# Patient Record
Sex: MALE | Race: WHITE | NOT HISPANIC OR LATINO | Employment: FULL TIME | URBAN - METROPOLITAN AREA
[De-identification: names, ages, dates, MRNs, and addresses within clinical notes are randomized per-mention and may not be internally consistent; named-entity substitution may affect disease eponyms.]

---

## 2024-05-09 ENCOUNTER — OFFICE VISIT (OUTPATIENT)
Dept: UROLOGY | Facility: CLINIC | Age: 74
End: 2024-05-09
Payer: MEDICARE

## 2024-05-09 VITALS
HEIGHT: 72 IN | SYSTOLIC BLOOD PRESSURE: 162 MMHG | HEART RATE: 57 BPM | BODY MASS INDEX: 27.77 KG/M2 | OXYGEN SATURATION: 94 % | DIASTOLIC BLOOD PRESSURE: 92 MMHG | WEIGHT: 205 LBS

## 2024-05-09 DIAGNOSIS — N52.9 ERECTILE DYSFUNCTION, UNSPECIFIED ERECTILE DYSFUNCTION TYPE: Primary | ICD-10-CM

## 2024-05-09 DIAGNOSIS — R97.20 ELEVATED PSA: ICD-10-CM

## 2024-05-09 DIAGNOSIS — Z80.42 FAMILY HISTORY OF PROSTATE CANCER IN FATHER: ICD-10-CM

## 2024-05-09 LAB — PSA, DIAGNOSTIC: 5.7 NG/ML (ref 0.1–4)

## 2024-05-09 PROCEDURE — 1101F PT FALLS ASSESS-DOCD LE1/YR: CPT | Mod: CPTII,S$GLB,, | Performed by: UROLOGY

## 2024-05-09 PROCEDURE — 1159F MED LIST DOCD IN RCRD: CPT | Mod: CPTII,S$GLB,, | Performed by: UROLOGY

## 2024-05-09 PROCEDURE — 3288F FALL RISK ASSESSMENT DOCD: CPT | Mod: CPTII,S$GLB,, | Performed by: UROLOGY

## 2024-05-09 PROCEDURE — 36415 COLL VENOUS BLD VENIPUNCTURE: CPT | Mod: S$GLB,,, | Performed by: UROLOGY

## 2024-05-09 PROCEDURE — 1126F AMNT PAIN NOTED NONE PRSNT: CPT | Mod: CPTII,S$GLB,, | Performed by: UROLOGY

## 2024-05-09 PROCEDURE — 3077F SYST BP >= 140 MM HG: CPT | Mod: CPTII,S$GLB,, | Performed by: UROLOGY

## 2024-05-09 PROCEDURE — 99204 OFFICE O/P NEW MOD 45 MIN: CPT | Mod: S$GLB,,, | Performed by: UROLOGY

## 2024-05-09 PROCEDURE — 3080F DIAST BP >= 90 MM HG: CPT | Mod: CPTII,S$GLB,, | Performed by: UROLOGY

## 2024-05-09 RX ORDER — MULTIVIT WITH MINERALS/HERBS
1 TABLET ORAL DAILY
COMMUNITY

## 2024-05-09 NOTE — PROGRESS NOTES
Subjective:       Patient ID: Master Kate is a 73 y.o. male.    Chief Complaint: No chief complaint on file.      HPI:  73-year-old male patient presenting to the clinic to establish care.  Patient has a family history of prostate cancer and 3 of his brothers.  The patient has not had a recent PSA in over 4 years.  He is unsure if his PSA has ever been elevated.  He denies any issues at today's visit.  We do not have any access to his previous records.  He does not have a PCP    Past Medical History: History reviewed. No pertinent past medical history.    Past Surgical Historical: History reviewed. No pertinent surgical history.     Medications:   Medication List with Changes/Refills   Current Medications    B COMPLEX VITAMINS TABLET    Take 1 tablet by mouth once daily.        Past Social History:   Social History     Socioeconomic History    Marital status: Single   Tobacco Use    Smoking status: Never     Passive exposure: Never    Smokeless tobacco: Never   Substance and Sexual Activity    Alcohol use: Not Currently     Comment: not often       Allergies:   Review of patient's allergies indicates:   Allergen Reactions    Pcn [penicillins] Rash        Family History:   Family History   Problem Relation Name Age of Onset    Prostate cancer Father      Prostate cancer Brother          Review of Systems:  Review of Systems   Constitutional:  Negative for activity change and appetite change.   HENT:  Negative for congestion and dental problem.    Eyes:  Negative for visual disturbance.   Respiratory:  Negative for chest tightness and shortness of breath.    Cardiovascular:  Negative for chest pain.   Gastrointestinal:  Negative for abdominal distention and abdominal pain.   Endocrine: Negative for polyuria.   Genitourinary:  Negative for difficulty urinating, dysuria, flank pain, frequency, hematuria, penile discharge, penile pain, penile swelling, scrotal swelling, testicular pain and urgency.   Musculoskeletal:   Negative for back pain and neck pain.   Skin:  Negative for color change.   Allergic/Immunologic: Positive for immunocompromised state.   Neurological:  Negative for dizziness.   Hematological:  Negative for adenopathy.   Psychiatric/Behavioral:  Negative for agitation, behavioral problems and confusion.        Physical Exam:  Physical Exam  Constitutional:       Appearance: He is well-developed.   HENT:      Head: Normocephalic and atraumatic.   Eyes:      Pupils: Pupils are equal, round, and reactive to light.   Cardiovascular:      Rate and Rhythm: Normal rate and regular rhythm.      Heart sounds: Normal heart sounds.   Pulmonary:      Effort: Pulmonary effort is normal. No respiratory distress.      Breath sounds: Normal breath sounds. No wheezing or rales.   Abdominal:      General: Bowel sounds are normal. There is no distension.      Palpations: Abdomen is soft.      Tenderness: There is no abdominal tenderness. There is no guarding or rebound.   Genitourinary:     Penis: Normal. No tenderness.       Prostate: Normal.   Musculoskeletal:         General: Normal range of motion.         Assessment/Plan:     Family history of prostate cancer:  Patient has not had a recent PSA we will draw his PSA today's visit and notify him of results.  Emphasized the importance of regular follow-up due to significant family history of prostate cancer.  No other issues at this time    Erectile dysfunction:  Managed with Cialis.  Patient does not need a refill at this time.  Notify our clinic if refill is needed in the future  Problem List Items Addressed This Visit    None

## 2024-05-09 NOTE — PROGRESS NOTES
Due to patient's BP being elevated, it was rechecked at end of appointment at 1040 prior to PS blood draw, resulting in 160/84.   PSA blood draw, using aseptic technique, butterfly stick x1 to right a/c, with success. 1X1 gauze was applied and wrapped with coban. Patient tolerated well.

## 2024-05-10 ENCOUNTER — TELEPHONE (OUTPATIENT)
Dept: UROLOGY | Facility: CLINIC | Age: 74
End: 2024-05-10
Payer: MEDICARE

## 2024-05-10 NOTE — TELEPHONE ENCOUNTER
Spoke with pt and informed of results. Pt set up for 6 months out with pa prior per Dr. Wilson. ----- Message from Rossana Reyes NP sent at 5/10/2024  9:01 AM CDT -----  Please notify patient of PSA results. Dr. Wilson reviewed results and suggests 6 month follow up with a PSA prior.

## 2024-10-24 ENCOUNTER — PATIENT MESSAGE (OUTPATIENT)
Dept: RESEARCH | Facility: HOSPITAL | Age: 74
End: 2024-10-24
Payer: MEDICARE

## 2025-05-06 ENCOUNTER — TELEPHONE (OUTPATIENT)
Dept: UROLOGY | Facility: CLINIC | Age: 75
End: 2025-05-06
Payer: MEDICARE

## 2025-05-06 NOTE — TELEPHONE ENCOUNTER
----- Message from Sharon sent at 5/6/2025  2:10 PM CDT -----  Contact: FLAVIO BONILLA [[id number]]  ...Type:  Patient Rescheduling Appointment Who Called: FLAVIO BONILLA [[id number]]Date of appointment?: 5/13Why they can't make it:  date doesn't workWould the patient rather a call back or a response via [facility]?  callBest Call Back Number: [phone] (home) Additional Information:  5/15 or 5/16

## 2025-05-07 ENCOUNTER — CLINICAL SUPPORT (OUTPATIENT)
Dept: FAMILY MEDICINE | Facility: CLINIC | Age: 75
End: 2025-05-07
Payer: MEDICARE

## 2025-05-07 DIAGNOSIS — R97.20 ELEVATED PSA: ICD-10-CM

## 2025-05-07 DIAGNOSIS — R97.20 ELEVATED PSA: Primary | ICD-10-CM

## 2025-05-07 LAB — PSA, DIAGNOSTIC: 5.36 NG/ML (ref 0–4)

## 2025-05-08 ENCOUNTER — RESULTS FOLLOW-UP (OUTPATIENT)
Dept: UROLOGY | Facility: CLINIC | Age: 75
End: 2025-05-08

## 2025-05-22 ENCOUNTER — OFFICE VISIT (OUTPATIENT)
Dept: UROLOGY | Facility: CLINIC | Age: 75
End: 2025-05-22
Payer: MEDICARE

## 2025-05-22 VITALS
SYSTOLIC BLOOD PRESSURE: 167 MMHG | HEIGHT: 72 IN | WEIGHT: 210 LBS | BODY MASS INDEX: 28.44 KG/M2 | HEART RATE: 50 BPM | DIASTOLIC BLOOD PRESSURE: 95 MMHG

## 2025-05-22 DIAGNOSIS — N52.9 ERECTILE DYSFUNCTION, UNSPECIFIED ERECTILE DYSFUNCTION TYPE: ICD-10-CM

## 2025-05-22 DIAGNOSIS — R97.20 ELEVATED PSA: Primary | ICD-10-CM

## 2025-05-22 PROCEDURE — 1101F PT FALLS ASSESS-DOCD LE1/YR: CPT | Mod: CPTII,,, | Performed by: UROLOGY

## 2025-05-22 PROCEDURE — 99214 OFFICE O/P EST MOD 30 MIN: CPT | Mod: S$PBB,,, | Performed by: UROLOGY

## 2025-05-22 PROCEDURE — 3080F DIAST BP >= 90 MM HG: CPT | Mod: CPTII,,, | Performed by: UROLOGY

## 2025-05-22 PROCEDURE — 1159F MED LIST DOCD IN RCRD: CPT | Mod: CPTII,,, | Performed by: UROLOGY

## 2025-05-22 PROCEDURE — 3288F FALL RISK ASSESSMENT DOCD: CPT | Mod: CPTII,,, | Performed by: UROLOGY

## 2025-05-22 PROCEDURE — 1126F AMNT PAIN NOTED NONE PRSNT: CPT | Mod: CPTII,,, | Performed by: UROLOGY

## 2025-05-22 PROCEDURE — 3077F SYST BP >= 140 MM HG: CPT | Mod: CPTII,,, | Performed by: UROLOGY

## 2025-05-22 NOTE — PROGRESS NOTES
Subjective:       Patient ID: Master Kate is a 74 y.o. male.    Chief Complaint: No chief complaint on file.      HPI:  74-year-old male patient presenting to the clinic to establish care.  Patient has a family history of prostate cancer and 3 of his brothers.  He denies any issues at today's visit.  We do not have any access to his previous records.  He does not have a PCP.    05/08/2025    5.36  05/09/2024    5.7    Past Medical History: History reviewed. No pertinent past medical history.    Past Surgical Historical: History reviewed. No pertinent surgical history.     Medications:   Medication List with Changes/Refills   Current Medications    B COMPLEX VITAMINS TABLET    Take 1 tablet by mouth once daily.        Past Social History:   Social History     Socioeconomic History    Marital status: Single   Tobacco Use    Smoking status: Never     Passive exposure: Never    Smokeless tobacco: Never   Substance and Sexual Activity    Alcohol use: Not Currently     Comment: not often       Allergies:   Review of patient's allergies indicates:   Allergen Reactions    Pcn [penicillins] Rash        Family History:   Family History   Problem Relation Name Age of Onset    Prostate cancer Father      Prostate cancer Brother          Review of Systems:  Review of Systems   Constitutional:  Negative for activity change and appetite change.   HENT:  Negative for congestion and dental problem.    Eyes:  Negative for visual disturbance.   Respiratory:  Negative for chest tightness and shortness of breath.    Cardiovascular:  Negative for chest pain.   Gastrointestinal:  Negative for abdominal distention and abdominal pain.   Endocrine: Negative for polyuria.   Genitourinary:  Negative for difficulty urinating, dysuria, flank pain, frequency, hematuria, penile discharge, penile pain, penile swelling, scrotal swelling, testicular pain and urgency.   Musculoskeletal:  Negative for back pain and neck pain.   Skin:  Negative for  color change.   Allergic/Immunologic: Positive for immunocompromised state.   Neurological:  Negative for dizziness.   Hematological:  Negative for adenopathy.   Psychiatric/Behavioral:  Negative for agitation, behavioral problems and confusion.        Physical Exam:  Physical Exam  Constitutional:       Appearance: He is well-developed.   HENT:      Head: Normocephalic and atraumatic.   Eyes:      Pupils: Pupils are equal, round, and reactive to light.   Cardiovascular:      Rate and Rhythm: Normal rate and regular rhythm.      Heart sounds: Normal heart sounds.   Pulmonary:      Effort: Pulmonary effort is normal. No respiratory distress.      Breath sounds: Normal breath sounds. No wheezing or rales.   Abdominal:      General: Bowel sounds are normal. There is no distension.      Palpations: Abdomen is soft.      Tenderness: There is no abdominal tenderness. There is no guarding or rebound.   Genitourinary:     Penis: Normal. No tenderness.       Prostate: Normal.   Musculoskeletal:         General: Normal range of motion.         Assessment/Plan:     Family history of prostate cancer:  Emphasized the importance of regular follow-up due to significant family history of prostate cancer.  No other issues at this time.  Patient was previously scheduled for six-month follow up however he canceled  and kept yearly follow up.    Erectile dysfunction:  Managed with Cialis.  Patient does not need a refill at this time.  Notify our clinic if refill is needed in the future  Problem List Items Addressed This Visit    None